# Patient Record
Sex: MALE
[De-identification: names, ages, dates, MRNs, and addresses within clinical notes are randomized per-mention and may not be internally consistent; named-entity substitution may affect disease eponyms.]

---

## 2022-08-15 ENCOUNTER — APPOINTMENT (OUTPATIENT)
Dept: ORTHOPEDIC SURGERY | Facility: CLINIC | Age: 22
End: 2022-08-15

## 2022-08-15 VITALS — WEIGHT: 190 LBS | HEIGHT: 71 IN | BODY MASS INDEX: 26.6 KG/M2

## 2022-08-15 DIAGNOSIS — Z72.89 OTHER PROBLEMS RELATED TO LIFESTYLE: ICD-10-CM

## 2022-08-15 DIAGNOSIS — Z78.9 OTHER SPECIFIED HEALTH STATUS: ICD-10-CM

## 2022-08-15 PROBLEM — Z00.00 ENCOUNTER FOR PREVENTIVE HEALTH EXAMINATION: Status: ACTIVE | Noted: 2022-08-15

## 2022-08-15 PROCEDURE — 99203 OFFICE O/P NEW LOW 30 MIN: CPT

## 2022-08-15 PROCEDURE — 99072 ADDL SUPL MATRL&STAF TM PHE: CPT

## 2022-08-15 PROCEDURE — 73030 X-RAY EXAM OF SHOULDER: CPT | Mod: RT

## 2022-08-16 ENCOUNTER — OUTPATIENT (OUTPATIENT)
Dept: OUTPATIENT SERVICES | Facility: HOSPITAL | Age: 22
LOS: 1 days | End: 2022-08-16
Payer: COMMERCIAL

## 2022-08-16 ENCOUNTER — APPOINTMENT (OUTPATIENT)
Dept: MRI IMAGING | Facility: CLINIC | Age: 22
End: 2022-08-16

## 2022-08-16 DIAGNOSIS — M25.511 PAIN IN RIGHT SHOULDER: ICD-10-CM

## 2022-08-16 PROCEDURE — 73221 MRI JOINT UPR EXTREM W/O DYE: CPT

## 2022-08-16 PROCEDURE — 73221 MRI JOINT UPR EXTREM W/O DYE: CPT | Mod: 26,RT

## 2022-08-16 NOTE — HISTORY OF PRESENT ILLNESS
[de-identified] : 22 year old male from [facility] presents for evaluation of a right shoulder injury sustained this morning while doing physical training at the [employer]. He was performing bear crawls on wet turf when his right hand slipped and his arm was forcefully extended above his head. He complains of pain over the anterior aspect of the shoulder. Pain is worse with ROM. He took Advil for pain. Denies hx of prior shoulder problems. \par \par The patient's past medical history, past surgical history, medications and allergies were reviewed by me today with the patient and documented accordingly. In addition, the patient's family and social history, which were noncontributory to this visit, were reviewed also.

## 2022-08-16 NOTE — PHYSICAL EXAM
[de-identified] : Oriented to time, place, person\par Mood: Normal\par Affect: Normal\par Appearance: Healthy, well appearing, no acute distress.\par Gait: Normal\par Assistive Devices: None\par \par Right shoulder exam:\par \par Inspection: No malalignment, No defects, No atrophy\par Skin: No masses, No lesions\par Neck: Negative Spurling, full ROM, no pain with ROM\par AROM: FF to 180, abduction to 90, ER to 80, IR to mid lumbar\par Painful arc ROM: Pain with terminal motion\par Tenderness: + bicipital tenderness, no tenderness to greater tuberosity/RTC insertion, +anterior shoulder/lesser tuberosity tenderness\par Strength: 4/5 ER, 5/5 IR in adduction, 5/5 supraspinatus testing, +Falls's test\par AC joint: No TTP/pain with cross arm testing\par Biceps: Speed Negative, Yergason Negative \par Impingement test: +Adkins, +Neer\par Vasc: 2+ radial pulse \par Stability: Stable \par Neuro: AIN, PIN, Ulnar nerve intact to motor\par Sensation: Intact to light touch throughout  [de-identified] : The following radiographs were ordered and read by me during this patients visit. I reviewed each radiograph in detail with the patient and discussed the findings as highlighted below.\par \par 3 views of right shoulder were obtained today, 08/15/2022, that show no acute fracture or dislocation. There is no glenohumeral and no AC joint degenerative change seen. Type II acromion. There is no significant malalignment. No significant other obvious osseous abnormality, otherwise unremarkable.

## 2022-08-16 NOTE — ADDENDUM
[FreeTextEntry1] : This note was written by Belen Monte on 08/15/2022 acting solely as a scribe for Dr. Tutu Mata.\par \par All medical record entries made by the Scribe were at my, Dr. Tutu Mata, direction and personally dictated by me on 08/15/2022. I have personally reviewed the chart and agree that the record accurately reflects my personal performance of the history, physical exam, assessment and plan.

## 2022-08-16 NOTE — DISCUSSION/SUMMARY
[de-identified] : 21 y/o male with right shoulder pain. \par \par Patient presents with acute condition to the right shoulder.  The patient's symptoms are acute in nature, and is difficult to ascertain whether this is a strain of the shoulder versus some internal derangement. MRI Rx given to patient to further delineate any internal structural derrangement to the shoulder. This will allow for better understanding of the severity of disease, and allow for better stratification of treatment strategies (surgical vs. non-surgical). Patient is agreeable to further imaging.\par \par Recommendations: Activity rest/avoidance, ice, NSAIDs.  MRI imaging\par \par Followup: After MRI.

## 2022-08-29 ENCOUNTER — APPOINTMENT (OUTPATIENT)
Dept: ORTHOPEDIC SURGERY | Facility: CLINIC | Age: 22
End: 2022-08-29

## 2022-08-29 PROCEDURE — 99072 ADDL SUPL MATRL&STAF TM PHE: CPT

## 2022-08-29 PROCEDURE — 99214 OFFICE O/P EST MOD 30 MIN: CPT

## 2022-08-30 NOTE — HISTORY OF PRESENT ILLNESS
[de-identified] : 22 year old male from [facility] presents for reevaluation of a right shoulder injury sustained 8/15/22 while doing physical training at the [employer]. He was performing bear crawls on wet turf when his right hand slipped and his arm was forcefully extended above his head. He complains of pain over the anterior aspect of the shoulder. Pain is worse with ROM. He took Advil for pain. Denies hx of prior shoulder problems. He presents today for MRI review.

## 2022-08-30 NOTE — DISCUSSION/SUMMARY
[de-identified] : 23 y/o male with right shoulder anterior labral tear\par \par Patient presents for follow-up of his right shoulder.  MRI shows evidence of a mildly displaced anterosuperior and anteroinferior labral tear.  We discussed that given that he did not have any acute dislocation or instability events, that this current injury can be treated conservatively at this time.  We discussed that with focused physical therapy his shoulder should likely return to near normal levels of pain and function.  There is no indication for urgent surgical intervention.  However, we did discuss possible future consideration of stabilization if he continues to have pain or dysfunction with or without instability events.  Patient is agreeable and is electing to proceed with conservative management at this time. \par \par Recommendations: Begin trial of PT, Rx given. NSAIDs/Ice prn. \par \par Followup 2mos

## 2022-08-30 NOTE — PHYSICAL EXAM
[de-identified] : Oriented to time, place, person\par Mood: Normal\par Affect: Normal\par Appearance: Healthy, well appearing, no acute distress.\par Gait: Normal\par Assistive Devices: None\par \par Right shoulder exam:\par \par Inspection: No malalignment, No defects, No atrophy\par Skin: No masses, No lesions\par Neck: Negative Spurling, full ROM, no pain with ROM\par AROM: FF to 180, abduction to 90, ER to 80, IR to mid lumbar\par Painful arc ROM: Pain with terminal motion\par Tenderness: + bicipital tenderness, no tenderness to greater tuberosity/RTC insertion, +anterior shoulder/lesser tuberosity tenderness\par Strength: 4/5 ER, 5/5 IR in adduction, 5/5 supraspinatus testing, +[place]'s test\par AC joint: No TTP/pain with cross arm testing\par Biceps: Speed Negative, Yergason Negative \par Impingement test: +Adkins, +Neer\par Vasc: 2+ radial pulse \par Stability: Stable \par Neuro: AIN, PIN, Ulnar nerve intact to motor\par Sensation: Intact to light touch throughout  [de-identified] : 3 views of right shoulder were obtained 08/15/2022, that show no acute fracture or dislocation. There is no glenohumeral and no AC joint degenerative change seen. Type II acromion. There is no significant malalignment. No significant other obvious osseous abnormality, otherwise unremarkable. \par \par MRI right shoulder dated 8.16.2022 shows mildly displaced anterosuperior and anteroinferior labral tear. Mild intra-articular long head biceps tendinosis. Small amount of fluid tracking along the extra articular long head biceps tendon which may represent tenosynovitis. Mild supraspinatus tendinosis without tear. Minimal acromioclavicular arthrosis. Small glenohumeral joint effusion.

## 2022-08-30 NOTE — ADDENDUM
[FreeTextEntry1] : This note was written by Belen Monte on 08/29/2022 acting solely as a scribe for Dr. Tutu Mata.\par \par All medical record entries made by the Scribe were at my, Dr. Tutu Mata, direction and personally dictated by me on 08/29/2022. I have personally reviewed the chart and agree that the record accurately reflects my personal performance of the history, physical exam, assessment and plan.

## 2022-10-27 ENCOUNTER — APPOINTMENT (OUTPATIENT)
Dept: ORTHOPEDIC SURGERY | Facility: CLINIC | Age: 22
End: 2022-10-27

## 2022-10-27 PROCEDURE — 99213 OFFICE O/P EST LOW 20 MIN: CPT

## 2022-10-27 PROCEDURE — 99072 ADDL SUPL MATRL&STAF TM PHE: CPT

## 2022-10-27 NOTE — ADDENDUM
[FreeTextEntry1] : This note was written by Belen Monte on 10/27/2022 acting solely as a scribe for Dr. Tutu Mata.\par \par All medical record entries made by the Scribe were at my, Dr. Tutu Mata, direction and personally dictated by me on 10/27/2022. I have personally reviewed the chart and agree that the record accurately reflects my personal performance of the history, physical exam, assessment and plan.

## 2022-10-27 NOTE — PHYSICAL EXAM
[de-identified] : Oriented to time, place, person\par Mood: Normal\par Affect: Normal\par Appearance: Healthy, well appearing, no acute distress.\par Gait: Normal\par Assistive Devices: None\par \par Right shoulder exam:\par \par Inspection: No malalignment, No defects, No atrophy\par Skin: No masses, No lesions\par Neck: Negative Spurling, full ROM, no pain with ROM\par AROM: FF to 180, abduction to 90, ER to 80, IR to mid lumbar\par Painful arc ROM: no pain with terminal motion\par Tenderness: no bicipital tenderness, no tenderness to greater tuberosity/RTC insertion, no anterior shoulder/lesser tuberosity tenderness\par Strength: 5/5 ER, 5/5 IR in adduction, 5/5 supraspinatus testing, negative [place]'s test\par AC joint: No TTP/pain with cross arm testing\par Biceps: Speed Negative, Yergason Negative \par Impingement test: Negative Adkins, Negative Neer\par Vasc: 2+ radial pulse \par Stability: Stable \par Neuro: AIN, PIN, Ulnar nerve intact to motor\par Sensation: Intact to light touch throughout  [de-identified] : 3 views of right shoulder were obtained 08/15/2022, that show no acute fracture or dislocation. There is no glenohumeral and no AC joint degenerative change seen. Type II acromion. There is no significant malalignment. No significant other obvious osseous abnormality, otherwise unremarkable. \par \par MRI right shoulder dated 8.16.2022 shows mildly displaced anterosuperior and anteroinferior labral tear. Mild intra-articular long head biceps tendinosis. Small amount of fluid tracking along the extra articular long head biceps tendon which may represent tenosynovitis. Mild supraspinatus tendinosis without tear. Minimal acromioclavicular arthrosis. Small glenohumeral joint effusion.

## 2022-10-27 NOTE — HISTORY OF PRESENT ILLNESS
[de-identified] : 22 year old male from [facility] presents for follow up right shoulder pain s/p injury sustained 8/15/22 while doing physical training at the [employer].  He has been doing PT since last visit. He feels that he is at 100% and would like to be cleared for full duty. He is not taking pain medication.  Denies hx of prior shoulder problems, pain.

## 2022-10-27 NOTE — DISCUSSION/SUMMARY
[de-identified] : 23 y/o male with right shoulder anterior labral tear\par \par Patient presents for follow-up of his right shoulder.  MRI showed evidence of a mildly displaced anterosuperior and anteroinferior labral tear.  We discussed that given that he did not have any acute dislocation or instability events, that this current injury can be treated conservatively.  Patient has been cleared by his physical therapist as he is return to full function without any disability.  Clinically, patient ROM and strength has improved significantly. \par \par He is cleared to return to activity with no restrictions.  Cleared for sea duty.\par \par Recommendations: NSAIDs/Ice prn. \par \par Followup after Sea duty in spring for re-evaluation.

## 2023-10-26 ENCOUNTER — APPOINTMENT (OUTPATIENT)
Dept: ORTHOPEDIC SURGERY | Facility: CLINIC | Age: 23
End: 2023-10-26
Payer: OTHER GOVERNMENT

## 2023-10-26 VITALS — BODY MASS INDEX: 25.9 KG/M2 | HEIGHT: 71 IN | WEIGHT: 185 LBS

## 2023-10-26 DIAGNOSIS — M25.511 PAIN IN RIGHT SHOULDER: ICD-10-CM

## 2023-10-26 PROCEDURE — 99213 OFFICE O/P EST LOW 20 MIN: CPT

## 2023-10-29 PROBLEM — M25.511 RIGHT SHOULDER PAIN: Status: ACTIVE | Noted: 2022-08-15
